# Patient Record
Sex: MALE | Race: WHITE | Employment: OTHER | URBAN - METROPOLITAN AREA
[De-identification: names, ages, dates, MRNs, and addresses within clinical notes are randomized per-mention and may not be internally consistent; named-entity substitution may affect disease eponyms.]

---

## 2022-08-18 ENCOUNTER — HOSPITAL ENCOUNTER (OUTPATIENT)
Dept: PHYSICAL THERAPY | Age: 67
Discharge: HOME | End: 2022-08-18
Payer: OTHER GOVERNMENT

## 2022-08-18 PROCEDURE — 97163 PT EVAL HIGH COMPLEX 45 MIN: CPT

## 2022-08-18 PROCEDURE — 97110 THERAPEUTIC EXERCISES: CPT

## 2022-08-18 NOTE — PROGRESS NOTES
PT DAILY TREATMENT NOTE/LUMBAR EVAL     Patient Name: Boris London  Date:2022  : 1955  [x]  Patient  Verified  Payor: [facility] CCN / Plan: [facility] CCN / Product Type: Federal [employer]ed Programs /    In [employer] time:1150  Total Treatment Time (min): 20  Visit #: 1 of 10    Medicare/BCBS Only   Total Timed Codes (min):    1:1 Treatment Time:          Treatment Area: Other low back pain [M54.59]      SUBJECTIVE  Pain Level (0-10 scale): 9  [x]constant []intermittent []improving []worsening []no change since onset     Any medication changes, allergies to medications, adverse drug reactions, diagnosis change, or new procedure performed?: [x] No    [] Yes (see summary sheet for update)  Subjective functional status/changes:       Subjective: Patient is a 73 y.o. male referred to PT with the above Dx. Patient seen today for c/o back pain with inability to bend over. Pain in the back is elevated at 9/10 and has been going on for the past year. Limited ability to walk and bend over due to back pain     Patient presents to PT with an impaired gait, decreased balance, decreased strength, decreased flexibility, and decreased mobility of the low back and pelvis. He has a pelvic obliquity, Tight Lx spine with limited ability for trunk Rot and Fwd Flexion. Patient s/s appear to be consistent w/ diagnosis. Patient demonstrates the potential to make functional gains within a reasonable time frame. Patient will benefit from skilled PT to address impairments and improve functional mobility, strength, gait and balance for an improved quality of life.   Fall Risk Assessment: Patient demonstrates elevated Fall Risk      Mechanism of Injury: Unsure    Comorbidities: Depression, OA, HTN, Hearing Impaired, Tobacco Use, Difficult to understand  Prior Level of Function: Pain not to the point to require medical attention    FOTO: 1 / 15 in 69 visits    Goal: Want the pain to stop    Health Status: Poor    What type of work do you do? N/A    Living Situation/Domestic Life: Lives at home with caregiver  Mobility: Mod (I)  Self Care Mod (I)  Stairs in the home? Unsure    What type of daily activities/hobbies? Unsure    Limitations to Activity/Recreation/PLOF: Walking, Bending, Standing, lifting         Barriers: [x]pain []financial []time []transportation []other    Motivation: High    FABQ Score: []low []elevate    Cognition: A & O x 3    Other:    Risk For Falls:   []No  []low [x]elevate           OBJECTIVE/EXAMINATION  Demonstrated Balance: poor       Demonstrated Mobility: Mod (I) - supervision  Gait: Extremely slow reciprocal gait with quad Cane  - Trunk Rot (B) 20%  - Limited ability to bend forward with added pain  - TTP at the low back (B) Lx paraspinal and right PSIS region  - Elev right crest with TTP at the top of the right crest  - Decreased mobility (B) Innominate in stork stance  - Limited evaluation due to TC         AROM PROM Strength Pain? ?    Right Left Right Left Right Left    Hip Flx     4 4    Hip Ext          Hib ABD          Hip ADD          Hip IR          Hip ER          Knee Flx          Knee Ext          Ankle DF          Ankle PF          Ankle IV          Ankle EV          Toe DF          Toe PF          HS 90/90          SLR                                               7 min [x]Eval                  []Re-Eval         8 min Therapeutic Exercise:  [x] See flow sheet : Develop and instruct HEP   Rationale: increase ROM, increase strength, and improve coordination to improve the patients ability to Initiate HEP and improve daily function     5 min Manual Therapy:  (B) Innominate p/a mobs, (B) LE LAD, (B) L/S and T/S Rot mobs    The manual therapy interventions were performed at a separate and distinct time from the therapeutic activities interventions.   Rationale: decrease pain, increase ROM, and increase tissue extensibility to improve activity tolerance With   [x] TE   [] TA   [] neuro   [] other: Patient Education: [x] Review HEP    [] Progressed/Changed HEP based on:   [] positioning   [] body mechanics   [] transfers   [] heat/ice application    [] other:       Other Objective/Functional Measures:      Access Code: [email or web address]  URL: [email or web address]  Date: 08/18/2022  Prepared by: Zia Lesrayne    Exercises  Seated March - 2 x daily - 7 x weekly - 3 sets - 10 reps  Seated Lumbar Flexion Stretch - 2 x daily - 7 x weekly - 3 sets - 10 reps  Supine Lower Trunk Rotation - 2 x daily - 7 x weekly - 3 sets - 10 reps  Supine Lumbar Rotation Stretch - 2 x daily - 7 x weekly - 3 sets - 10 reps  Seated Trunk Rotation - Arms Crossed - 2 x daily - 7 x weekly - 3 sets - 10 reps  Seated Sidebending - 2 x daily - 7 x weekly - 3 sets - 10 reps    Pain Level (0-10 scale) post treatment: 9     ASSESSMENT/Changes in Function:    - Pt demo understanding of HEP  - Pt with improved pelvic mobility after treatment  - Improved pace with walking after treatment      [x]  See Plan of Care  []  See progress note/recertification  []  See Discharge Summary         Progress towards goals / Updated goals:  Short Term Goals: To be accomplished in 5 treatments:  1. Pt will be compliant and independent with HEP in order to facilitate PT sessions and aid with self management   Eval Status:  Initiated   Current Status:  2. Pt to tolerate 30 min or more of TE and/or Interventions w/o increased s/s   Eval Status:  Initiated   Current Status:    Long Term Goals: To be accomplished in 10 treatments:  1. Pt will report 50% improvement or better with low back dysfunction to show a significant increase in ability to tolerate ADL   Eval Status:  Initiated   Current Status:  2.   Pt will ambulate 100' with least restrictive devise with an increased pace for carryover to light community ambulation    Eval Status:  ~ 4 min to walk 50' Mod (I) with quad cane   Current Status:  3. Pt will demonstrate get up and go in 30\" or less to show improved function with general daily activity     Eval Status:  Initiated   Current Status:  4. Pt will have decreased pain at 5/10 or better in the low back for improved tolerance of daily activity for a better quality of life     Eval Status:  Pain 9/10   Current Status:  5.   Pt will improve FOTO score to 15 in 69 visits to show significant improvement in function for progress to extreme difficulty performing usual daily activity   Eval Status: FOTO = 1, Exceedingly limited with routine functions   Current Status:      PLAN  [x]  Upgrade activities as tolerated     [x]  Continue plan of care  []  Update interventions per flow sheet       []  Discharge due to:_  []  Other:_       Chely Obrien, PT 8/18/2022  8:52 AM

## 2022-08-18 NOTE — PROGRESS NOTES
[facility] at [address] [facility] [address] [facility], [address] [place] [address]  Phone: [phone]      Fax:  [phone]    Plan of Care/ Statement of Necessity for Physical Therapy Services  Patient name: Mariaelena Veliz Start of Care: 2022   Referral source: Eve Villasenor MD : 1955    Medical Diagnosis: Other low back pain [M54.59]  Payor: Nile Crenshaw / Plan: [id number] / Product Type: [employer] Programs /  Onset Date: On or about 22    Treatment Diagnosis: LBP   Prior Hospitalization: see medical history Provider#: [id number]   Medications: Verified on Patient summary List    Comorbidities: Depression, OA, HTN, Hearing Impaired, Tobacco Use, Difficult to understand  Prior Level of Function: Pain not to the point to require medical attention         The Plan of Care and following information is based on the information from the initial evaluation. Assessment/ key information: Patient is a 73 y.o. male referred to PT with the above Dx. Patient seen today for c/o back pain with inability to bend over. Pain in the back is elevated at 9/10 and has been going on for the past year. Limited ability to walk and bend over due to back pain      Patient presents to PT with an impaired gait, decreased balance, decreased strength, decreased flexibility, and decreased mobility of the low back and pelvis. He has a pelvic obliquity, Tight Lx spine with limited ability for trunk Rot and Fwd Flexion. With a significant slowed posture, we will attempt to improve function to the point of functional mobility. However, if progress is not acceptable, this patient may benefit form assistance in a home health setting. Patient s/s appear to be consistent w/ diagnosis. Patient demonstrates the potential to make functional gains within a reasonable time frame.   Patient will benefit from skilled PT to address impairments and improve functional mobility, strength, gait and balance for an improved quality of life. Fall Risk Assessment: Patient demonstrates elevated Fall Risk       Evaluation Complexity History HIGH Complexity :3+ comorbidities / personal factors will impact the outcome/ POC ; Examination MEDIUM Complexity : 3 Standardized tests and measures addressing body structure, function, activity limitation and / or participation in recreation  ;Presentation HIGH Complexity : Unstable and unpredictable characteristics  ; Clinical Decision Making HIGH Complexity : FOTO score of 1- 25   Overall Complexity Rating: HIGH   Problem List: pain affecting function, decrease ROM, decrease strength, impaired gait/ balance, decrease ADL/ functional abilitiies, decrease activity tolerance, decrease flexibility/ joint mobility, decrease transfer abilities, and other FOTO = 1    Treatment Plan may include any combination of the following: Therapeutic exercise, Therapeutic activities, Neuromuscular re-education, Physical agent/modality, Gait/balance training, Manual therapy, Patient education, Self Care training, Functional mobility training, Home safety training, and Stair training      Patient / Family readiness to learn indicated by: asking questions, trying to perform skills, and interest  Persons(s) to be included in education: patient (P)  Barriers to Learning/Limitations: yes;  language, emotional, sensory deficits-vision/hearing/speech, and physical  Patient Goal (s): Want the pain to stop  Patient Self Reported Health Status: poor  Rehabilitation Potential: good    Short Term Goals: To be accomplished in 5 treatments:  1. Pt will be compliant and independent with HEP in order to facilitate PT sessions and aid with self management   Eval Status:  Initiated   Current Status:  2. Pt to tolerate 30 min or more of TE and/or Interventions w/o increased s/s   Eval Status:  Initiated   Current Status:    Long Term Goals: To be accomplished in 10 treatments:  1.   Pt will report 50% improvement or better with low back dysfunction to show a significant increase in ability to tolerate ADL   Eval Status:  Initiated   Current Status:  2. Pt will ambulate 100' with least restrictive devise with an increased pace for carryover to light community ambulation    Eval Status:  ~ 4 min to walk 50' Mod (I) with quad cane   Current Status:  3. Pt will demonstrate get up and go in 30\" or less to show improved function with general daily activity     Eval Status:  Initiated   Current Status:  4. Pt will have decreased pain at 5/10 or better in the low back for improved tolerance of daily activity for a better quality of life     Eval Status:  Pain 9/10   Current Status:  5. Pt will improve FOTO score to 15 in 69 visits to show significant improvement in function for progress to extreme difficulty performing usual daily activity   Eval Status: FOTO = 1, Exceedingly limited with routine functions   Current Status:     Frequency / Duration: Patient to be seen 2 times per week for 10 treatments. Patient/ Caregiver education and instruction: Diagnosis, prognosis, self care, activity modification, and exercises   [x]  Plan of care has been reviewed with PAZ Vides, PT 8/18/2022 8:51 AM  _____________________________________________________________________  I certify that the above Therapy Services are being furnished while the patient is under my care. I agree with the treatment plan and certify that this therapy is necessary.     Physician's Signature:____________Date:_________TIME:________     Herrera Gillespie MD  ** Signature, Date and Time must be completed for valid certification **    Please sign and return to [facility] at [address] [facility]  [place], [facility] [address]  Phone: [phone]      Fax:  [phone]

## 2022-08-19 ENCOUNTER — TELEPHONE (OUTPATIENT)
Dept: PHYSICAL THERAPY | Age: 67
End: 2022-08-19

## 2022-11-14 NOTE — PROGRESS NOTES
[facility] at [address]  Phone: [phone]      Fax:  [phone]    Discharge Summary    Patient name: Gladys Ortiz Start of Care: 2022   Referral source: Meghann Fuller MD : 1955               Medical Diagnosis: Other low back pain [M54.59]  Payor: Zakia Howard / Plan: [facility] / Product Type: [employer] Programs /  Onset Date: On or about 22               Treatment Diagnosis: LBP   Prior Hospitalization: see medical history Provider#: [id number]   Medications: Verified on Patient summary List    Comorbidities: Depression, OA, HTN, Hearing Impaired, Tobacco Use, Difficult to understand  Prior Level of Function: Pain not to the point to require medical attention   Function: Pain not to the point to require medical attention       Visits from Start of Care: 1    Missed Visits: 0    Reporting Period : 22 to 22      Goal: No goals achieved or assessed due to no follow up visits attended      Assessment/ Summary of Care: Patient seen for initial evaluation only with no follow up visits scheduled or attended    RECOMMENDATIONS:  [x]Discontinue therapy: []Patient has reached or is progressing toward set goals      [x]Patient is non-compliant or has abdicated      []Due to lack of appreciable progress towards set goals    Josué Bull, PT 2022 8:38 PM

## 2022-12-01 ENCOUNTER — HOSPITAL ENCOUNTER (OUTPATIENT)
Dept: PREADMISSION TESTING | Age: 67
Discharge: HOME | End: 2022-12-01

## 2022-12-01 NOTE — PERIOP NOTES
Called patient to complete the PAT interview-patient's niece Yudy Salas) requested that we call tomorrow between 9-10am when she will be with patient.

## 2022-12-02 VITALS — HEIGHT: 66 IN | BODY MASS INDEX: 27.48 KG/M2 | WEIGHT: 171 LBS

## 2022-12-02 RX ORDER — ACETAMINOPHEN 325 MG/1
650 TABLET ORAL
COMMUNITY

## 2022-12-02 RX ORDER — FLUTICASONE FUROATE AND VILANTEROL 200; 25 UG/1; UG/1
1 POWDER RESPIRATORY (INHALATION) DAILY
COMMUNITY

## 2022-12-02 RX ORDER — IPRATROPIUM BROMIDE AND ALBUTEROL SULFATE 2.5; .5 MG/3ML; MG/3ML
3 SOLUTION RESPIRATORY (INHALATION) 2 TIMES DAILY
COMMUNITY

## 2022-12-02 RX ORDER — CRANBERRY CONC/C/BACILL COAG 250-30-15
1 TABLET ORAL DAILY
COMMUNITY

## 2022-12-02 RX ORDER — CHOLECALCIFEROL (VITAMIN D3) 125 MCG/ML
1 DROPS ORAL DAILY
COMMUNITY

## 2022-12-02 RX ORDER — LANOLIN ALCOHOL/MO/W.PET/CERES
1000 CREAM (GRAM) TOPICAL DAILY
COMMUNITY

## 2022-12-02 RX ORDER — LIDOCAINE 50 MG/G
1 PATCH TOPICAL EVERY 24 HOURS
COMMUNITY

## 2022-12-02 RX ORDER — ASPIRIN 325 MG
325 TABLET ORAL DAILY
COMMUNITY

## 2022-12-02 RX ORDER — SODIUM CHLORIDE, SODIUM LACTATE, POTASSIUM CHLORIDE, CALCIUM CHLORIDE 600; 310; 30; 20 MG/100ML; MG/100ML; MG/100ML; MG/100ML
125 INJECTION, SOLUTION INTRAVENOUS CONTINUOUS
Status: CANCELLED | OUTPATIENT
Start: 2022-12-02

## 2022-12-02 RX ORDER — DULOXETIN HYDROCHLORIDE 30 MG/1
30 CAPSULE, DELAYED RELEASE ORAL DAILY
COMMUNITY

## 2022-12-02 NOTE — PERIOP NOTES
Patient gave permission to speak with his Chris Mancia [facility]) for the Tom Millán vipul Mckeon interview over the phone. Ms. Be Dust aware to bring the poa paperwork on admission. No sleep apnea, removable prosthetic devices or family history of malignant hyperthermia. CHG was x3 days instructions reviewed-patient's niece to  the kit. PCP is aware of the surgery. No participation in clinical trial or research study. Do not bring any valuables on DOS- jewelry, wallet, cash, laptop, medications. Does meet criteria for special population-Mary in posting notified. Possible time delay day of surgery reviewed. Covid Status-not vaccinated DNR status-none. Patient's niece reported that patient needs a neurology consult prior to surgery per Dr. Sahara De Santiago office.